# Patient Record
Sex: FEMALE | Race: OTHER | URBAN - METROPOLITAN AREA
[De-identification: names, ages, dates, MRNs, and addresses within clinical notes are randomized per-mention and may not be internally consistent; named-entity substitution may affect disease eponyms.]

---

## 2017-09-14 ENCOUNTER — IMPORTED ENCOUNTER (OUTPATIENT)
Dept: URBAN - METROPOLITAN AREA CLINIC 50 | Facility: CLINIC | Age: 82
End: 2017-09-14

## 2017-09-22 ENCOUNTER — IMPORTED ENCOUNTER (OUTPATIENT)
Dept: URBAN - METROPOLITAN AREA CLINIC 50 | Facility: CLINIC | Age: 82
End: 2017-09-22

## 2017-10-05 ENCOUNTER — IMPORTED ENCOUNTER (OUTPATIENT)
Dept: URBAN - METROPOLITAN AREA CLINIC 50 | Facility: CLINIC | Age: 82
End: 2017-10-05

## 2017-10-31 ENCOUNTER — IMPORTED ENCOUNTER (OUTPATIENT)
Dept: URBAN - METROPOLITAN AREA CLINIC 50 | Facility: CLINIC | Age: 82
End: 2017-10-31

## 2017-11-21 ENCOUNTER — IMPORTED ENCOUNTER (OUTPATIENT)
Dept: URBAN - METROPOLITAN AREA CLINIC 50 | Facility: CLINIC | Age: 82
End: 2017-11-21

## 2018-08-09 NOTE — PATIENT DISCUSSION
MYOPIA, OU- DISC OPT OF REFRACTIVE SX-VS-GLS/OOCE-HE-MWCPTS. PT UNDERSTANDS OPTIONS AND DESIRES TO PROCEED WITH REFRACTIVE SX TO IMPROVE VA AND REDUCE DEPENDENCY ON GLS/CTLS.

## 2019-08-19 ENCOUNTER — IMPORTED ENCOUNTER (OUTPATIENT)
Dept: URBAN - METROPOLITAN AREA CLINIC 50 | Facility: CLINIC | Age: 84
End: 2019-08-19

## 2019-08-20 ENCOUNTER — IMPORTED ENCOUNTER (OUTPATIENT)
Dept: URBAN - METROPOLITAN AREA CLINIC 50 | Facility: CLINIC | Age: 84
End: 2019-08-20

## 2019-08-21 ENCOUNTER — IMPORTED ENCOUNTER (OUTPATIENT)
Dept: URBAN - METROPOLITAN AREA CLINIC 50 | Facility: CLINIC | Age: 84
End: 2019-08-21

## 2019-12-11 ENCOUNTER — IMPORTED ENCOUNTER (OUTPATIENT)
Dept: URBAN - METROPOLITAN AREA CLINIC 50 | Facility: CLINIC | Age: 84
End: 2019-12-11

## 2019-12-13 ENCOUNTER — IMPORTED ENCOUNTER (OUTPATIENT)
Dept: URBAN - METROPOLITAN AREA CLINIC 50 | Facility: CLINIC | Age: 84
End: 2019-12-13

## 2019-12-26 ENCOUNTER — IMPORTED ENCOUNTER (OUTPATIENT)
Dept: URBAN - METROPOLITAN AREA CLINIC 50 | Facility: CLINIC | Age: 84
End: 2019-12-26

## 2020-01-07 ENCOUNTER — IMPORTED ENCOUNTER (OUTPATIENT)
Dept: URBAN - METROPOLITAN AREA CLINIC 50 | Facility: CLINIC | Age: 85
End: 2020-01-07

## 2020-01-07 NOTE — PATIENT DISCUSSION
"""S/P IOL OS: Tecnis ZCB00 +24.0 (Target: [place]) +Femto/Arcs +Omidria. Continue post operative instructions and drops per schedule.  """

## 2020-01-16 ENCOUNTER — IMPORTED ENCOUNTER (OUTPATIENT)
Dept: URBAN - METROPOLITAN AREA CLINIC 50 | Facility: CLINIC | Age: 85
End: 2020-01-16

## 2020-01-16 NOTE — PATIENT DISCUSSION
"""S/P IOL OS: Tecnis ZCB00 +24.0 (Target: [place]) +Femto/Arcs +Omidria.  Continue post operative instructions and drops per schedule. "" ""Continue Pred-Moxi-Nepaf left eye three times a day

## 2020-01-16 NOTE — PATIENT DISCUSSION
"""Discussed with patient decreased visual potential after cataract surgery due to history of macular pathology. Patient understands. "" ""Monitor ERM for changes. Informed patient of potential for worsening. Instructed patient to call with changes in vision. Recommend regular [place]msler grid checks.  """

## 2020-01-21 ENCOUNTER — IMPORTED ENCOUNTER (OUTPATIENT)
Dept: URBAN - METROPOLITAN AREA CLINIC 50 | Facility: CLINIC | Age: 85
End: 2020-01-21

## 2020-01-21 NOTE — PATIENT DISCUSSION
"""S/P IOL OD: Tecnis ZCB00 +23.50 (Target: [place]) +Femto/Arcs +TM/Omidria. Continue post operative instructions and drops per schedule.  """

## 2020-01-22 ENCOUNTER — IMPORTED ENCOUNTER (OUTPATIENT)
Dept: URBAN - METROPOLITAN AREA CLINIC 50 | Facility: CLINIC | Age: 85
End: 2020-01-22

## 2020-01-28 ENCOUNTER — IMPORTED ENCOUNTER (OUTPATIENT)
Dept: URBAN - METROPOLITAN AREA CLINIC 50 | Facility: CLINIC | Age: 85
End: 2020-01-28

## 2020-01-28 NOTE — PATIENT DISCUSSION
"""S/P IOL OD: Tecnis ZCB00 +23.50 (Target: [place]) +Femto/Arcs +TM/Omidria. Continue post operative instructions and drops per schedule.  """

## 2020-02-13 ENCOUNTER — IMPORTED ENCOUNTER (OUTPATIENT)
Dept: URBAN - METROPOLITAN AREA CLINIC 50 | Facility: CLINIC | Age: 85
End: 2020-02-13

## 2020-02-13 NOTE — PATIENT DISCUSSION
"""S/P IOL OU: OD: Tecnis ZCB00 +23.50 (Target: [place])Femto/Arcs +TMOmidria. OS: Tecnis ZCB00 +24.0 (Target: [place])/Arcs. "

## 2020-03-27 ENCOUNTER — IMPORTED ENCOUNTER (OUTPATIENT)
Dept: URBAN - METROPOLITAN AREA CLINIC 50 | Facility: CLINIC | Age: 85
End: 2020-03-27

## 2020-05-13 ENCOUNTER — IMPORTED ENCOUNTER (OUTPATIENT)
Dept: URBAN - METROPOLITAN AREA CLINIC 50 | Facility: CLINIC | Age: 85
End: 2020-05-13

## 2021-04-17 ASSESSMENT — TONOMETRY
OS_IOP_MMHG: 16
OD_IOP_MMHG: 16
OD_IOP_MMHG: 14
OS_IOP_MMHG: 22
OS_IOP_MMHG: 13
OD_IOP_MMHG: 22
OS_IOP_MMHG: 18
OD_IOP_MMHG: 34
OS_IOP_MMHG: 17
OD_IOP_MMHG: 20
OD_IOP_MMHG: 16
OS_IOP_MMHG: 20
OD_IOP_MMHG: 9
OS_IOP_MMHG: 35
OS_IOP_MMHG: 15
OD_IOP_MMHG: 18
OD_IOP_MMHG: 20
OS_IOP_MMHG: 15
OS_IOP_MMHG: 16
OD_IOP_MMHG: 14
OD_IOP_MMHG: 18
OS_IOP_MMHG: 15
OD_IOP_MMHG: 22
OD_IOP_MMHG: 16
OS_IOP_MMHG: 21
OD_IOP_MMHG: 14
OS_IOP_MMHG: 21
OS_IOP_MMHG: 20
OS_IOP_MMHG: 23
OS_IOP_MMHG: 16
OD_IOP_MMHG: 36
OD_IOP_MMHG: 7
OS_IOP_MMHG: 36
OS_IOP_MMHG: 12

## 2021-04-17 ASSESSMENT — PACHYMETRY
OD_CT_UM: 578
OS_CT_UM: 564
OS_CT_UM: 564
OD_CT_UM: 578
OD_CT_UM: 578
OS_CT_UM: 564
OD_CT_UM: 578
OS_CT_UM: 564
OD_CT_UM: 578
OD_CT_UM: 578
OS_CT_UM: 564
OD_CT_UM: 578
OS_CT_UM: 564
OD_CT_UM: 578
OS_CT_UM: 564
OD_CT_UM: 578
OD_CT_UM: 578
OS_CT_UM: 564
OS_CT_UM: 564
OD_CT_UM: 578
OS_CT_UM: 564
OS_CT_UM: 564
OD_CT_UM: 578
OS_CT_UM: 564

## 2021-04-17 ASSESSMENT — VISUAL ACUITY
OD_PH: 20/50
OD_OTHER: <400.
OD_SC: 20/50-
OS_BAT: <400
OS_OTHER: 20/25. 20/25.
OS_BAT: 20/25
OS_CC: 20/20
OD_BAT: 20/40
OS_BAT: <400
OD_CC: 20/60
OS_CC: 20/50+1
OS_PH: 20/40-1
OD_OTHER: <400.
OD_BAT: 20/>400
OS_SC: 20/20
OD_SC: 20/200
OD_OTHER: 20/50. 20/60.
OS_CC: 20/50+2
OD_BAT: 20/50
OS_BAT: 20/>400
OS_PH: 20/40-1
OS_BAT: 20/25
OD_OTHER: <400.
OS_BAT: <400
OD_BAT: <400
OS_CC: J1
OD_SC: 20/40+
OS_CC: 20/50-2
OS_SC: 20/20
OD_CC: 20/50
OD_OTHER: 20/40. 20/50.
OD_SC: 20/200
OD_BAT: <400
OS_OTHER: <400. <400.
OD_CC: J5
OD_OTHER: 20/>400.
OS_SC: 20/20
OS_OTHER: 20/25. 20/25.
OS_OTHER: <400. <400.
OS_CC: 20/50
OD_CC: 20/40
OS_OTHER: <400.
OS_OTHER: 20/>400.
OD_CC: 20/60
OS_CC: J5
OD_BAT: <400
OD_CC: J1

## 2021-08-16 ENCOUNTER — PREPPED CHART (OUTPATIENT)
Dept: URBAN - METROPOLITAN AREA CLINIC 49 | Facility: CLINIC | Age: 86
End: 2021-08-16

## 2021-08-23 ENCOUNTER — ROUTINE EXAM (OUTPATIENT)
Dept: URBAN - METROPOLITAN AREA CLINIC 49 | Facility: CLINIC | Age: 86
End: 2021-08-23

## 2021-08-23 DIAGNOSIS — H35.361: ICD-10-CM

## 2021-08-23 DIAGNOSIS — H35.371: ICD-10-CM

## 2021-08-23 DIAGNOSIS — Z01.01: ICD-10-CM

## 2021-08-23 PROCEDURE — 92015 DETERMINE REFRACTIVE STATE: CPT

## 2021-08-23 PROCEDURE — 92134 CPTRZ OPH DX IMG PST SGM RTA: CPT

## 2021-08-23 PROCEDURE — 92014 COMPRE OPH EXAM EST PT 1/>: CPT

## 2021-08-23 ASSESSMENT — VISUAL ACUITY
OS_GLARE: 20/40
OU_CC: J1+@18"
OD_CC: 20/100
OS_CC: 20/20
OS_GLARE: 20/60

## 2021-08-23 ASSESSMENT — TONOMETRY
OD_IOP_MMHG: 12
OS_IOP_MMHG: 11
OD_IOP_MMHG: 14
OS_IOP_MMHG: 10

## 2022-08-31 ENCOUNTER — COMPREHENSIVE EXAM (OUTPATIENT)
Dept: URBAN - METROPOLITAN AREA CLINIC 49 | Facility: CLINIC | Age: 87
End: 2022-08-31

## 2022-08-31 DIAGNOSIS — H35.371: ICD-10-CM

## 2022-08-31 DIAGNOSIS — Z01.01: ICD-10-CM

## 2022-08-31 DIAGNOSIS — H35.361: ICD-10-CM

## 2022-08-31 PROCEDURE — 92014 COMPRE OPH EXAM EST PT 1/>: CPT

## 2022-08-31 PROCEDURE — 92134 CPTRZ OPH DX IMG PST SGM RTA: CPT

## 2022-08-31 ASSESSMENT — VISUAL ACUITY
OS_SC: 20/20-2
OS_GLARE: 20/50
OU_CC: J1+@16"
OS_GLARE: 20/70
OD_SC: 20/100

## 2022-08-31 ASSESSMENT — TONOMETRY
OS_IOP_MMHG: 14
OD_IOP_MMHG: 15